# Patient Record
(demographics unavailable — no encounter records)

---

## 2024-10-31 NOTE — HISTORY OF PRESENT ILLNESS
[FreeTextEntry1] : bloodwork [de-identified] : 37 year old female here to check b12 levels  was told it was low last summer and came for weekly injections for a month but then never had her levels rechecked she has not taken an oral supplementation since then

## 2024-12-04 NOTE — HISTORY OF PRESENT ILLNESS
[FreeTextEntry1] : Mile is here for a f/u visit. She continues to get 2% alkalinized lidocaine BLIs monthly with good results.  She is doing great and would like to extend BLIs to prn. She is only using pyridium as needed.   She is adding more foods without flare symptoms. She reports little to no IC symptoms since starting intravesical therapy. She has not had any further UTIs or symptoms.  She is no longer using topical ETG10% cream to introitus qhs. She denies vulvar pain/burning. She has some mild vulvar burring approx 5d prior to menses which then resolves with menses. She reports no vulvar pain with PFPT, tampons. She is once again sexually active without dyspareunia.  Once after intercourse she developed a "bump" at posterior fourchette that felt like a cut and burned with urination. Resolved in a week. Denies having flu like symptoms, discharge, odor, itching. Felt more sore vs. painful. Has not recurred.  Still going to PFPT monthly with Evelyne. She is going to stop at the end of the year and restart if needed. She reports urethral and posterior bladder wall pain with PFPT.  She is still taking supplements.  Started [employer] freeze dried aloe vera which she thinks is helping. She did not start amitriptyline or uribel.

## 2024-12-04 NOTE — PHYSICAL EXAM
[No Acute Distress] : in no acute distress [Well developed] : well developed [Well Nourished] : ~L well nourished [Good Hygeine] : demonstrates good hygeine [Oriented x3] : oriented to person, place, and time [Normal Memory] : ~T memory was ~L unimpaired [Normal Mood/Affect] : mood and affect are normal [Respirations regular] : ~T respiratory rate was regular [No Edema] : ~T edema was not present [Tenderness] : ~T ~M abdominal tenderness observed [None] : no CVA tenderness [Warm and Dry] : was warm and dry to touch [Normal Gait] : gait was normal [No Lesions] : no lesions were seen on the external genitalia [Labia Majora] : were normal [Labia Minora] : were normal [Normal Appearance] : general appearance was normal [Pink Rugae] : pink rugae [Moderate] : there was moderate vaginal bleeding [Exam Deferred] : was deferred [Normal] : no abnormalities [Post Void Residual ____ml] : post void residual was [unfilled] ml [Distended] : not distended [de-identified] : Q-tip touch test negative. No erythema, no fissures, ulcerations, erosions [FreeTextEntry4] : PFMs 2/4, supple, non-tender to palpation

## 2024-12-04 NOTE — DISCUSSION/SUMMARY
[FreeTextEntry1] : Decrease BLis to prn only. May stop PFPT at end of month then restart prn. Suggested she try pyridium 200mg 2hours prior to PFPT to try to mitigate pain with palpation of posterior bladder wall/bladder neck. Continue stretching, HEP Continue supplements  Trial Prelief  May use pyridium 200mg BID prn IC symptoms.   May restart topical ETG10% if needed.  RTO BLIs prn RTO 4 months

## 2025-02-10 NOTE — HISTORY OF PRESENT ILLNESS
[FreeTextEntry1] : 38 yo  here for follow up  here for follow up pap  dating someone, everytime has sex (which is not that frequent), gets BV. last had sex monday, 3 days after sex had odor and sx of BV. doesn't use condoms. Partner also recently had URI, wondering if he made her get BV.   doing Pt for cystitis and has been diagnosed

## 2025-02-10 NOTE — HISTORY OF PRESENT ILLNESS
[FreeTextEntry1] : 36 yo  here for follow up  here for follow up pap  dating someone, everytime has sex (which is not that frequent), gets BV. last had sex monday, 3 days after sex had odor and sx of BV. doesn't use condoms. Partner also recently had URI, wondering if he made her get BV.   doing Pt for cystitis and has been diagnosed

## 2025-07-21 NOTE — PHYSICAL EXAM
[No Acute Distress] : no acute distress [Well Nourished] : well nourished [Well Developed] : well developed [Well-Appearing] : well-appearing [Normal Sclera/Conjunctiva] : normal sclera/conjunctiva [PERRL] : pupils equal round and reactive to light [EOMI] : extraocular movements intact [Normal Outer Ear/Nose] : the outer ears and nose were normal in appearance [Normal Oropharynx] : the oropharynx was normal [No Lymphadenopathy] : no lymphadenopathy [Supple] : supple [Thyroid Normal, No Nodules] : the thyroid was normal and there were no nodules present [No Respiratory Distress] : no respiratory distress  [No Accessory Muscle Use] : no accessory muscle use [Clear to Auscultation] : lungs were clear to auscultation bilaterally [Normal Rate] : normal rate  [Regular Rhythm] : with a regular rhythm [Normal S1, S2] : normal S1 and S2 [No Murmur] : no murmur heard [No Varicosities] : no varicosities [Pedal Pulses Present] : the pedal pulses are present [No Edema] : there was no peripheral edema [Soft] : abdomen soft [Non Tender] : non-tender [Non-distended] : non-distended [No Masses] : no abdominal mass palpated [Normal Posterior Cervical Nodes] : no posterior cervical lymphadenopathy [Normal Anterior Cervical Nodes] : no anterior cervical lymphadenopathy [No CVA Tenderness] : no CVA  tenderness [No Spinal Tenderness] : no spinal tenderness [Grossly Normal Strength/Tone] : grossly normal strength/tone [No Rash] : no rash [Normal Gait] : normal gait [Normal Affect] : the affect was normal [Normal Insight/Judgement] : insight and judgment were intact [de-identified] : right inguinal lymph node vs. cyst - most likely cyst due to so superficial

## 2025-07-21 NOTE — PLAN
[FreeTextEntry1] : BW drawn today Encourage 150 minutes of physical activity a week Encouraged well balanced diet, low carbohydrates/fatty foods follow up 1 year or sooner if needed

## 2025-07-21 NOTE — ASSESSMENT
[Vaccines Reviewed] : Immunizations reviewed today. Please see immunization details in the vaccine log within the immunization flowsheet.  [FreeTextEntry1] : CPE:  - skin cancer screening yearly  - pap abnormal - repeating next month  - high risk breast cancer screening   Hypothyroidism: Levothyroxine 50mcg - well controlled   B12 deficiency: taking OTC supplementation   Enalrged lymph node vs cyst:  - most likely due to ingrown hair, very superifical  - no erythema, warmth to touch, no open wound - encourage warm compress with epsom salt

## 2025-07-21 NOTE — PHYSICAL EXAM
[No Acute Distress] : no acute distress [Well Nourished] : well nourished [Well Developed] : well developed [Well-Appearing] : well-appearing [Normal Sclera/Conjunctiva] : normal sclera/conjunctiva [PERRL] : pupils equal round and reactive to light [EOMI] : extraocular movements intact [Normal Outer Ear/Nose] : the outer ears and nose were normal in appearance [Normal Oropharynx] : the oropharynx was normal [No Lymphadenopathy] : no lymphadenopathy [Supple] : supple [Thyroid Normal, No Nodules] : the thyroid was normal and there were no nodules present [No Respiratory Distress] : no respiratory distress  [No Accessory Muscle Use] : no accessory muscle use [Clear to Auscultation] : lungs were clear to auscultation bilaterally [Normal Rate] : normal rate  [Regular Rhythm] : with a regular rhythm [Normal S1, S2] : normal S1 and S2 [No Murmur] : no murmur heard [No Varicosities] : no varicosities [Pedal Pulses Present] : the pedal pulses are present [No Edema] : there was no peripheral edema [Soft] : abdomen soft [Non Tender] : non-tender [Non-distended] : non-distended [No Masses] : no abdominal mass palpated [Normal Posterior Cervical Nodes] : no posterior cervical lymphadenopathy [Normal Anterior Cervical Nodes] : no anterior cervical lymphadenopathy [No CVA Tenderness] : no CVA  tenderness [No Spinal Tenderness] : no spinal tenderness [Grossly Normal Strength/Tone] : grossly normal strength/tone [No Rash] : no rash [Normal Gait] : normal gait [Normal Affect] : the affect was normal [Normal Insight/Judgement] : insight and judgment were intact [de-identified] : right inguinal lymph node vs. cyst - most likely cyst due to so superficial

## 2025-07-21 NOTE — HISTORY OF PRESENT ILLNESS
[FreeTextEntry1] : CPE [de-identified] : 39yo F presents for CPE. Pt reports she is doing well, currently single, recently traveled a lot for work and enjoyed it. Pt reports she had a couple episodes of BV that she associated with last relationship, has since not had issues. Pt reports abnormal pap, has appt for repeat next month. Pt sees dermatology yearly for skin cancer screening.  Pt denies any CP, palpitations, SOB, AMARO, fevers, chills, abdominal pain, N/V, diarrhea, constipation, BRBPR or dark tarry stools.

## 2025-07-21 NOTE — HISTORY OF PRESENT ILLNESS
[FreeTextEntry1] : CPE [de-identified] : 39yo F presents for CPE. Pt reports she is doing well, currently single, recently traveled a lot for work and enjoyed it. Pt reports she had a couple episodes of BV that she associated with last relationship, has since not had issues. Pt reports abnormal pap, has appt for repeat next month. Pt sees dermatology yearly for skin cancer screening.  Pt denies any CP, palpitations, SOB, AMARO, fevers, chills, abdominal pain, N/V, diarrhea, constipation, BRBPR or dark tarry stools.

## 2025-07-21 NOTE — HEALTH RISK ASSESSMENT
[Yes] : Yes [Monthly or less (1 pt)] : Monthly or less (1 point) [1 or 2 (0 pts)] : 1 or 2 (0 points) [Never (0 pts)] : Never (0 points) [No] : In the past 12 months have you used drugs other than those required for medical reasons? No [0] : 2) Feeling down, depressed, or hopeless: Not at all (0) [PHQ-2 Negative - No further assessment needed] : PHQ-2 Negative - No further assessment needed [I have developed a follow-up plan documented below in the note.] : I have developed a follow-up plan documented below in the note. [Good] : ~his/her~  mood as  good [Former] : Former [0-4] : 0-4 [Patient reported mammogram was normal] : Patient reported mammogram was normal [Patient reported PAP Smear was abnormal] : Patient reported PAP Smear was abnormal [Alone] : lives alone [Employed] : employed [Single] : single [Feels Safe at Home] : Feels safe at home [Fully functional (bathing, dressing, toileting, transferring, walking, feeding)] : Fully functional (bathing, dressing, toileting, transferring, walking, feeding) [Fully functional (using the telephone, shopping, preparing meals, housekeeping, doing laundry, using] : Fully functional and needs no help or supervision to perform IADLs (using the telephone, shopping, preparing meals, housekeeping, doing laundry, using transportation, managing medications and managing finances) [Patient reported colonoscopy was normal] : Patient reported colonoscopy was normal [Audit-CScore] : 1 [de-identified] : regularly exercising [de-identified] : well balanced diet  [[id number]] : 0 [Sexually Active] : not sexually active [MammogramDate] : 07/24 [PapSmearDate] : 02/25 [ColonoscopyDate] : 06/21

## 2025-07-21 NOTE — HEALTH RISK ASSESSMENT
[Yes] : Yes [Monthly or less (1 pt)] : Monthly or less (1 point) [1 or 2 (0 pts)] : 1 or 2 (0 points) [Never (0 pts)] : Never (0 points) [No] : In the past 12 months have you used drugs other than those required for medical reasons? No [0] : 2) Feeling down, depressed, or hopeless: Not at all (0) [PHQ-2 Negative - No further assessment needed] : PHQ-2 Negative - No further assessment needed [I have developed a follow-up plan documented below in the note.] : I have developed a follow-up plan documented below in the note. [Good] : ~his/her~  mood as  good [Former] : Former [0-4] : 0-4 [Patient reported mammogram was normal] : Patient reported mammogram was normal [Patient reported PAP Smear was abnormal] : Patient reported PAP Smear was abnormal [Alone] : lives alone [Employed] : employed [Single] : single [Feels Safe at Home] : Feels safe at home [Fully functional (bathing, dressing, toileting, transferring, walking, feeding)] : Fully functional (bathing, dressing, toileting, transferring, walking, feeding) [Fully functional (using the telephone, shopping, preparing meals, housekeeping, doing laundry, using] : Fully functional and needs no help or supervision to perform IADLs (using the telephone, shopping, preparing meals, housekeeping, doing laundry, using transportation, managing medications and managing finances) [Patient reported colonoscopy was normal] : Patient reported colonoscopy was normal [Audit-CScore] : 1 [de-identified] : regularly exercising [de-identified] : well balanced diet  [[id number]] : 0 [Sexually Active] : not sexually active [MammogramDate] : 07/24 [PapSmearDate] : 02/25 [ColonoscopyDate] : 06/21